# Patient Record
Sex: FEMALE | Race: WHITE | URBAN - METROPOLITAN AREA
[De-identification: names, ages, dates, MRNs, and addresses within clinical notes are randomized per-mention and may not be internally consistent; named-entity substitution may affect disease eponyms.]

---

## 2024-03-13 ENCOUNTER — OFFICE VISIT (OUTPATIENT)
Age: 59
End: 2024-03-13

## 2024-03-13 VITALS — BODY MASS INDEX: 23.14 KG/M2 | HEIGHT: 66 IN | WEIGHT: 144 LBS

## 2024-03-13 DIAGNOSIS — M72.0 DUPUYTREN'S CONTRACTURE OF RIGHT HAND: Primary | ICD-10-CM

## 2024-03-13 DIAGNOSIS — M72.0 DUPUYTREN'S DISEASE OF PALM OF BOTH HANDS: ICD-10-CM

## 2024-03-13 PROCEDURE — 99203 OFFICE O/P NEW LOW 30 MIN: CPT | Performed by: ORTHOPAEDIC SURGERY

## 2024-03-13 RX ORDER — OMEPRAZOLE 20 MG/1
20 CAPSULE, DELAYED RELEASE ORAL DAILY
COMMUNITY

## 2024-03-13 RX ORDER — ALPRAZOLAM 0.5 MG/1
0.5 TABLET ORAL NIGHTLY PRN
COMMUNITY

## 2024-03-13 RX ORDER — LEVOTHYROXINE SODIUM 0.1 MG/1
100 TABLET ORAL DAILY
COMMUNITY

## 2024-03-13 RX ORDER — ESTRADIOL 1 MG/1
1 TABLET ORAL DAILY
COMMUNITY

## 2024-03-13 RX ORDER — LORATADINE 10 MG/1
10 TABLET ORAL DAILY
COMMUNITY

## 2024-03-13 RX ORDER — ERGOCALCIFEROL 1.25 MG/1
50000 CAPSULE ORAL WEEKLY
COMMUNITY

## 2024-03-13 RX ORDER — CELECOXIB 100 MG/1
100 CAPSULE ORAL 2 TIMES DAILY
COMMUNITY

## 2024-03-13 NOTE — PROGRESS NOTES
neck supple.      Right lower leg: No edema.      Left lower leg: No edema.   Skin:     General: Skin is warm and dry.      Capillary Refill: Capillary refill takes less than 2 seconds.      Findings: No bruising or erythema.   Neurological:      General: No focal deficit present.      Mental Status: She is alert and oriented to person, place, and time.   Psychiatric:         Mood and Affect: Mood normal.         Behavior: Behavior normal.          Hand:    Examination L Digit(s) R Digit(s)   1st CMC Tenderness -  -    1st CMC Grind -  -    Rashi Nodes -  -    Heberden Nodes -  -    A1 Pulley Tenderness -  -    Triggering -  -    UCL Instability -  -    RCL Instability -  -    Lateral Stress Pain -  -    Palmar Cords -  + RSF MP 30   Tabletop test -  -    Garrod's Pads -  -     Strength       Pinch Strength         ROM: Full        Imaging:     None indicated      Impression     Diagnosis Orders   1. Dupuytren's contracture of right hand  PROCEDURE AUTHORIZATION TO       2. Dupuytren's disease of palm of both hands              Plan:     Schedule right little finger MCP Xiaflex injection, manipulation, and splinting.    Return for Injection and Manipulation.     Plan was reviewed with patient, who verbalized agreement and understanding of the plan    Note: This note was completed using voice recognition software.  Any typographical/name errors or mistakes are unintentional.

## 2024-08-01 ENCOUNTER — HOSPITAL ENCOUNTER (OUTPATIENT)
Facility: HOSPITAL | Age: 59
Setting detail: RECURRING SERIES
Discharge: HOME | End: 2024-08-04
Payer: COMMERCIAL

## 2024-08-01 PROCEDURE — 97162 PT EVAL MOD COMPLEX 30 MIN: CPT | Performed by: PHYSICAL THERAPIST

## 2024-08-01 NOTE — THERAPY EVALUATION
[facility]  [address] [address] Ph: [phone] Fx: [phone]  Plan of Care / Statement of Necessity for Physical Therapy Services     Patient Name: Jasmin Sanchez : 1965   Medical   Diagnosis: Right ankle pain [M25.571]  Treatment Diagnosis:   M25.571  RIGHT ANKLE PAIN     Onset Date: 2024     Referral Source: Fermín Lopez DPM Start of Care (SOC): 2024   Prior Hospitalization: See medical history Provider #: [id number]   Prior Level of Function: Was working out daily, walking, lift weights, boating.    Comorbidities:  Musculoskeletal disorders, Complications related to surgery, and Other: Anxiety, Depression, recent surgery.     Assessment / key information:  Patient with signs and symptoms consistent with right ankle pain.  She is 8 weeks s/p ORIF right ankle fracture tomorrow.  She presents to PT with axillary crutches and is NWB.  We did discuss WB status.  She has limited AROM/PROM and decreased strength of her right ankle.  Functionally, she is very limited and is not doing any of her normal recreational activities.  She notes she is independent in her self care.    Patient will benefit from a program of skilled physical therapy to include therapeutic exercises to address strength deficits, therapeutic activities to improve functional mobility, neuromuscular reeducation to address balance, coordination and proprioception, manual therapy to address ROM and tissue extensibility and modalities as indicated.  All questions were answered.      Post operative: URGENT: Patient was evaluated for a post operative condition and early physical therapy intervention is critical to positive outcomes.  Insurance authorization should be expedited to prevent delay in treatment.      Evaluation Complexity:  History:  MEDIUM  Complexity : 1-2 comorbidities / personal factors will impact the outcome/ POC ; Examination:  MEDIUM Complexity : 3

## 2024-08-01 NOTE — THERAPY EVALUATION
Problem: Pain  Goal: #Acceptable pain level achieved/maintained at rest using NRS/Faces  Description: This goal is used for patients who can self-report.  Acceptable means the level is at or below the identified comfort/function goal.  Outcome: Outcome Met, Continue evaluating goal progress toward completion     Problem: VTE, Risk for  Goal: # No s/s of VTE  Outcome: Outcome Met, Continue evaluating goal progress toward completion     Problem: Seizures  Goal: Seizure activity controlled  Outcome: Outcome Met, Continue evaluating goal progress toward completion      PHYSICAL / OCCUPATIONAL THERAPY - DAILY TREATMENT NOTE (updated )    Patient Name: Jasmin Sanchez    Date: 2024    : 1965  Insurance: Payor: MITRA / Plan: [facility] / Product Type: *No Product type* /      Patient  verified yes     Visit #   Current / Total 1 8   Time   In / Out 1:40 2:20   Pain   In / Out 5 6   Subjective Functional Status/Changes: Patient fell and injured her right ankle on 2024.     TREATMENT AREA =  Right ankle pain [M25.571]    OBJECTIVE    33 min [x]Eval - untimed                      Therapeutic Procedures:  Tx Min Billable or 1:1 Min (if diff from Tx Min) Procedure, Rationale, Specifics   7  65047 Therapeutic Exercise (timed):  increase ROM, strength, coordination, balance, and proprioception to improve patient's ability to progress to PLOF and address remaining functional goals. (see flow sheet as applicable)     Details if applicable:     7  [facility] Totals Reminder: bill using total billable min of TIMED therapeutic procedures (example: do not include dry needle or estim unattended, both untimed codes, in totals to left)  8-22 min = 1 unit; 23-37 min = 2 units; 38-52 min = 3 units; 53-67 min = 4 units; 68-82 min = 5 units   Total Total     [x]  Patient Education billed concurrently with other procedures   [x] Review HEP    [] Progressed/Changed HEP, detail:    [] Other detail:       Objective Information/Functional Measures/Assessment    SUBJECTIVE  Pain Level (0-10 scale): 5/10 now; 2/10 at best on meds; 10/10 at worst.  [x]constant []intermittent [x]improving []worsening []no change since onset    Subjective functional status/changes:     PLOF: Was working out daily, walking, lift weights, boating.  Limitations to PLOF: Unable to do any activities.  Mechanism of Injury: Patient tripped and fell, injured her right ankle.  ORIF done 2024.  Current symptoms/Complaints: Patient with c/o constant pain in her right ankle pain.  She is walking with

## 2024-08-09 ENCOUNTER — HOSPITAL ENCOUNTER (OUTPATIENT)
Facility: HOSPITAL | Age: 59
Setting detail: RECURRING SERIES
Discharge: HOME | End: 2024-08-12
Payer: COMMERCIAL

## 2024-08-09 PROCEDURE — 97140 MANUAL THERAPY 1/> REGIONS: CPT

## 2024-08-09 PROCEDURE — 97110 THERAPEUTIC EXERCISES: CPT

## 2024-08-09 PROCEDURE — 97112 NEUROMUSCULAR REEDUCATION: CPT

## 2024-08-09 NOTE — PROGRESS NOTES
PHYSICAL / OCCUPATIONAL THERAPY - DAILY TREATMENT NOTE    Patient Name: Jasmin Sanchez    Date: 2024    : 1965  Insurance: Payor: MITRA / Plan: [employer] [facility] [employer] / Product Type: *No Product type* /      Patient  verified Yes     Visit #   Current / Total 2 8   Time   In / Out 1055 1145   Pain   In / Out 2-3 3   Subjective Functional Status/Changes: Patient states she followed up with the doctor yesterday and he was pleased with how everything is progressing.      TREATMENT AREA =  Right ankle pain [M25.571]     OBJECTIVE      Therapeutic Procedures:    08096 Therapeutic Exercise (timed):  increase ROM, strength, coordination, balance, and proprioception to improve patient's ability to progress to PLOF and address remaining functional goals.   Tx Min Billable or 1:1 Min   (if diff from Tx Min) Details:   32 25 See flow sheet as applicable     17635 Neuromuscular Re-Education (timed):  improve balance, coordination, kinesthetic sense, posture, core stability and proprioception to improve patient's ability to develop conscious control of individual muscles and awareness of position of extremities in order to progress to PLOF and address remaining functional goals.   Tx Min Billable or 1:1 Min   (if diff from Tx Min) Details:   10 10 See flow sheet as applicable     49188 Manual Therapy (timed):  decrease pain, increase ROM, increase tissue extensibility, decrease edema, decrease trigger points, and increase postural awareness to improve patient's ability to progress to PLOF and address remaining functional goals.  The manual therapy interventions were performed at a separate and distinct time from the therapeutic activities interventions .   Tx Min Billable or 1:1 Min   (if diff from Tx Min) Details:   8 8 Long sitting- PROM in all plans, PROM of great toe, Ray tray.        50 43 [facility] Totals Reminder: bill using total billable min of TIMED therapeutic procedures (example: do not include

## 2024-08-13 ENCOUNTER — HOSPITAL ENCOUNTER (OUTPATIENT)
Facility: HOSPITAL | Age: 59
Setting detail: RECURRING SERIES
Discharge: HOME | End: 2024-08-16
Payer: COMMERCIAL

## 2024-08-13 PROCEDURE — 97140 MANUAL THERAPY 1/> REGIONS: CPT

## 2024-08-13 PROCEDURE — 97110 THERAPEUTIC EXERCISES: CPT

## 2024-08-13 PROCEDURE — 97112 NEUROMUSCULAR REEDUCATION: CPT

## 2024-08-13 NOTE — PROGRESS NOTES
PHYSICAL / OCCUPATIONAL THERAPY - DAILY TREATMENT NOTE    Patient Name: Jasmin Sanchez    Date: 2024    : 1965  Insurance: Payor: MITRA / Plan: [employer] / Product Type: *No Product type* /      Patient  verified Yes     Visit #   Current / Total 3 8   Time   In / Out 11:41 12:22   Pain   In / Out 2 5   Subjective Functional Status/Changes: Pt states that her foot swells  Pt is taking prednisone 5 days ago, and pt notes it has helped reduce her foot pain.     TREATMENT AREA =  Right ankle pain [M25.571]     OBJECTIVE    Therapeutic Procedures:    14648 Therapeutic Exercise (timed):  increase ROM, strength, coordination, balance, and proprioception to improve patient's ability to progress to PLOF and address remaining functional goals.   Tx Min Billable or 1:1 Min   (if diff from Tx Min) Details:   15  See flow sheet as applicable     74955 Neuromuscular Re-Education (timed):  improve balance, coordination, kinesthetic sense, posture, core stability and proprioception to improve patient's ability to develop conscious control of individual muscles and awareness of position of extremities in order to progress to PLOF and address remaining functional goals.   Tx Min Billable or 1:1 Min   (if diff from Tx Min) Details:   14  See flow sheet as applicable     04490 Manual Therapy (timed):  decrease pain, increase ROM, and increase tissue extensibility to improve patient's ability to progress to PLOF and address remaining functional goals.  The manual therapy interventions were performed at a separate and distinct time from the therapeutic activities interventions .   Tx Min Billable or 1:1 Min   (if diff from Tx Min) Details:   10  Supine -- manual PROM to the ankle in all directions, PROM great toe, and ray glides       39  [facility] Totals Reminder: bill using total billable min of TIMED therapeutic procedures (example: do not include dry needle or estim unattended, both untimed codes, in

## 2024-08-15 ENCOUNTER — HOSPITAL ENCOUNTER (OUTPATIENT)
Facility: HOSPITAL | Age: 59
Setting detail: RECURRING SERIES
Discharge: HOME | End: 2024-08-18
Payer: COMMERCIAL

## 2024-08-15 PROCEDURE — 97110 THERAPEUTIC EXERCISES: CPT | Performed by: PHYSICAL THERAPIST

## 2024-08-15 PROCEDURE — 97140 MANUAL THERAPY 1/> REGIONS: CPT | Performed by: PHYSICAL THERAPIST

## 2024-08-15 PROCEDURE — 97112 NEUROMUSCULAR REEDUCATION: CPT | Performed by: PHYSICAL THERAPIST

## 2024-08-15 NOTE — PROGRESS NOTES
tolerance in order to improve her ambulation status.  Evaluation:  Introduced toe touch WB today.    Next PN/ RC due 9/1/2024  Auth due 14th visit.    PLAN  - Continue Plan of Care  - Upgrade activities as tolerated    Ed Lopez, MOHAN    8/15/2024    9:06 AM  If an interpreting service was utilized for treatment of this patient, the contents of this document represent the material reviewed with the patient via the .     Future Appointments   Date Time Provider Department Center   8/15/2024 11:00 AM Ed Lopez, PT [facility]   8/20/2024 11:00 AM Dre Moreno, PTA [facility]   8/22/2024 11:00 AM Dre Moreno, PTA [facility]   8/27/2024 11:00 AM Andrea Blue, PT [facility]   8/29/2024 11:40 AM Dre Moreno, PTA [facility]

## 2024-08-20 ENCOUNTER — HOSPITAL ENCOUNTER (OUTPATIENT)
Facility: HOSPITAL | Age: 59
Setting detail: RECURRING SERIES
Discharge: HOME | End: 2024-08-23
Payer: COMMERCIAL

## 2024-08-20 PROCEDURE — 97110 THERAPEUTIC EXERCISES: CPT

## 2024-08-20 PROCEDURE — 97140 MANUAL THERAPY 1/> REGIONS: CPT

## 2024-08-20 PROCEDURE — 97112 NEUROMUSCULAR REEDUCATION: CPT

## 2024-08-20 NOTE — PROGRESS NOTES
PHYSICAL / OCCUPATIONAL THERAPY - DAILY TREATMENT NOTE    Patient Name: Jasmin Sanchez    Date: 2024    : 1965  Insurance: Payor: MITRA / Plan: [employer] / Product Type: *No Product type* /      Patient  verified Yes     Visit #   Current / Total 5 8   Time   In / Out 1057 1142   Pain   In / Out 2 2   Subjective Functional Status/Changes: Patient states she feels she is walking more with putting weight through her leg at home. Patient states she felt she did too much at last therapy session due to feeling sore afterwards.      TREATMENT AREA =  Right ankle pain [M25.571]     OBJECTIVE    Therapeutic Procedures:       84319 Therapeutic Exercise (timed):  increase ROM, strength, coordination, balance, and proprioception to improve patient's ability to progress to PLOF and address remaining functional goals.   Tx Min Billable or 1:1 Min   (if diff from Tx Min) Details:   27  See flow sheet as applicable      84077 Neuromuscular Re-Education (timed):  improve balance, coordination, kinesthetic sense, posture, core stability and proprioception to improve patient's ability to develop conscious control of individual muscles and awareness of position of extremities in order to progress to PLOF and address remaining functional goals.   Tx Min Billable or 1:1 Min   (if diff from Tx Min) Details:   10  See flow sheet as applicable      23866 Manual Therapy (timed):  decrease pain, increase ROM, increase tissue extensibility, decrease edema, decrease trigger points, and increase postural awareness to improve patient's ability to progress to PLOF and address remaining functional goals.  The manual therapy interventions were performed at a separate and distinct time from the therapeutic activities interventions .   Tx Min Billable or 1:1 Min   (if diff from Tx Min) Details:   8  Long sitting- PROM in all plans, PROM of great toe, Ray glides.          45  [facility] Totals Reminder: bill using total

## 2024-08-22 ENCOUNTER — HOSPITAL ENCOUNTER (OUTPATIENT)
Facility: HOSPITAL | Age: 59
Setting detail: RECURRING SERIES
Discharge: HOME | End: 2024-08-25
Payer: COMMERCIAL

## 2024-08-22 PROCEDURE — 97140 MANUAL THERAPY 1/> REGIONS: CPT

## 2024-08-22 PROCEDURE — 97112 NEUROMUSCULAR REEDUCATION: CPT

## 2024-08-22 PROCEDURE — 97110 THERAPEUTIC EXERCISES: CPT

## 2024-08-22 NOTE — PROGRESS NOTES
PHYSICAL / OCCUPATIONAL THERAPY - DAILY TREATMENT NOTE    Patient Name: Jasmin Sanchez    Date: 2024    : 1965  Insurance: Payor: MITRA / Plan: [employer] / Product Type: *No Product type* /      Patient  verified Yes     Visit #   Current / Total 6 8   Time   In / Out 1055 1145   Pain   In / Out 2-3 4   Subjective Functional Status/Changes: Patient states that she walked around the grocery store yesterday.      TREATMENT AREA =  Right ankle pain [M25.571]     OBJECTIVE      Therapeutic Procedures:       82442 Therapeutic Exercise (timed):  increase ROM, strength, coordination, balance, and proprioception to improve patient's ability to progress to PLOF and address remaining functional goals.   Tx Min Billable or 1:1 Min   (if diff from Tx Min) Details:   27 25  See flow sheet as applicable      74441 Neuromuscular Re-Education (timed):  improve balance, coordination, kinesthetic sense, posture, core stability and proprioception to improve patient's ability to develop conscious control of individual muscles and awareness of position of extremities in order to progress to PLOF and address remaining functional goals.   Tx Min Billable or 1:1 Min   (if diff from Tx Min) Details:   10 10  See flow sheet as applicable      14047 Manual Therapy (timed):  decrease pain, increase ROM, increase tissue extensibility, decrease edema, decrease trigger points, and increase postural awareness to improve patient's ability to progress to PLOF and address remaining functional goals.  The manual therapy interventions were performed at a separate and distinct time from the therapeutic activities interventions .   Tx Min Billable or 1:1 Min   (if diff from Tx Min) Details:   8 8  Long sitting- PROM in all plans, PROM of great toe, Ray glides.          50 43 [facility] Totals Reminder: bill using total billable min of TIMED therapeutic procedures (example: do not include dry needle or estim unattended, both

## 2024-08-27 ENCOUNTER — HOSPITAL ENCOUNTER (OUTPATIENT)
Facility: HOSPITAL | Age: 59
Setting detail: RECURRING SERIES
Discharge: HOME | End: 2024-08-30
Payer: COMMERCIAL

## 2024-08-27 PROCEDURE — 97140 MANUAL THERAPY 1/> REGIONS: CPT

## 2024-08-27 PROCEDURE — 97110 THERAPEUTIC EXERCISES: CPT

## 2024-08-27 PROCEDURE — 97530 THERAPEUTIC ACTIVITIES: CPT

## 2024-08-27 NOTE — THERAPY RECERTIFICATION
[facility]  [address] [address] Ph: [phone] Fx: [phone]  PHYSICAL THERAPY PROGRESS NOTE  Patient Name: Jasmin Sanchez : 1965   Treatment/Medical Diagnosis: Right ankle pain [M25.571]   Referral Source: Fermín Lopez DPM     Date of Initial Visit: 24 Attended Visits: 7 Missed Visits: 0     SUMMARY OF TREATMENT  Treatment has been focused on range of motion, foot strengthening and initiation of functional progression.    CURRENT STATUS  Short Term Goals: To be accomplished in 1 WEEKS  1.  Patient will become proficient in their HEP and will be compliant in performing that program.  Evaluation:   Patient given a written/illustrated HEP.  Current; MET: patient reports performing HEP. 24     Long Term Goals: To be accomplished in 4 WEEKS  1. Patient's pain level will be 1-2/10 with activity in order to improve patient's ability to perform normal ADLs.  Evaluation:  2/10-10/10  Current: pain rating  worst: 210 24  Goal MET  2. Patient will demonstrate  AROM right ankle DF 0-5, PF 0-60, Inversion 0-45, eversion 0-15 to increase ease of ADLs.  Evaluation:  DF 60-20, PF 20-60, Inversion 0-15, Eversion 15-0  Current: DF= 5 PF= 40 Inversion=10 Eversion= 20  Goal not met  3. Patient will increase LEFS score to greater than 50 to indicate increased functional mobility.  Evaluation:  LEFS = 80  Current: 34/80  Goal not met  4. Patient will progress WB status to WB to tolerance in order to improve her ambulation status.  Evaluation:  Introduced toe touch WB today.  Current: progressing: patient intermittent ambulating with TTWB 24     Next PN: 2024  Auth due 14th visit, expires: 10/30/24      Payor: MITRA / Plan: [employer] / Product Type: *No Product type* /     Non-Medicare, can change goals, can adjust or add frequency duration, no signature required      New Goals to be achieved in __4__

## 2024-08-27 NOTE — PROGRESS NOTES
PHYSICAL / OCCUPATIONAL THERAPY - DAILY TREATMENT NOTE    Patient Name: Jasmin Sanchez    Date: 2024    : 1965  Insurance: Payor: MITRA / Plan: [employer] / Product Type: *No Product type* /      Patient  verified Yes     Visit #   Current / Total 7 8   Time   In / Out 11:00 11:40   Pain   In / Out 2 2   Subjective Functional Status/Changes: Pt. Sees MD on . C/o hypersensitivity on parts of foot.     TREATMENT AREA =  Right ankle pain [M25.571]         Therapeutic Procedures:    75083 Therapeutic Exercise (timed):  increase ROM, strength, coordination, balance, and proprioception to improve patient's ability to progress to PLOF and address remaining functional goals.   Tx Min Billable or 1:1 Min   (if diff from Tx Min) Details:   10  See flow sheet as applicable       92241 Manual Therapy (timed):  decrease pain, increase ROM, and increase tissue extensibility to improve patient's ability to progress to PLOF and address remaining functional goals.  The manual therapy interventions were performed at a separate and distinct time from the therapeutic activities interventions .   Tx Min Billable or 1:1 Min   (if diff from Tx Min) Details:   15  Scar mobs/ROM, dorsal STM     89633 Therapeutic Activity (timed):  use of dynamic activities replicating functional movements to increase ROM, strength, coordination, balance, and proprioception in order to improve patient's ability to progress to PLOF and address remaining functional goals.   Tx Min Billable or 1:1 Min   (if diff from Tx Min) Details:   15  See flow sheet as applicable and Pt. Education on desensitization, goals following progress assessment.       40  [facility] Totals Reminder: bill using total billable min of TIMED therapeutic procedures (example: do not include dry needle or estim unattended, both untimed codes, in totals to left)  8-22 min = 1 unit; 23-37 min = 2 units; 38-52 min = 3 units; 53-67 min = 4 units; 68-82 min =  treatment of this patient, the contents of this document represent the material reviewed with the patient via the .     Future Appointments   Date Time Provider Department Center   8/29/2024 11:40 AM Dre Moreno, PTA [facility]   9/3/2024 11:00 AM Adnrea Blue, PT [facility]   9/5/2024 11:00 AM Dre Moreno, PTA [facility]   9/10/2024 11:00 AM Carissa Wilcox PT [facility]   9/12/2024 11:00 AM Dre Moreno, PTA [facility]   9/17/2024 11:00 AM Dre Moreno, PTA [facility]   9/19/2024 11:00 AM Dre Moreno, PTA [facility]

## 2024-08-29 ENCOUNTER — TELEPHONE (OUTPATIENT)
Facility: HOSPITAL | Age: 59
End: 2024-08-29

## 2024-08-29 ENCOUNTER — HOSPITAL ENCOUNTER (OUTPATIENT)
Facility: HOSPITAL | Age: 59
Setting detail: RECURRING SERIES
End: 2024-08-29
Payer: COMMERCIAL

## 2024-08-29 NOTE — TELEPHONE ENCOUNTER
C[facility] <24hrs. Patient is unable to make today's appt, has a scheduling conflict. Rquested something later but the schedule is full, will add to wait list.

## 2024-09-03 ENCOUNTER — HOSPITAL ENCOUNTER (OUTPATIENT)
Facility: HOSPITAL | Age: 59
Setting detail: RECURRING SERIES
Discharge: HOME | End: 2024-09-06
Payer: COMMERCIAL

## 2024-09-03 PROCEDURE — 97530 THERAPEUTIC ACTIVITIES: CPT

## 2024-09-03 PROCEDURE — 97110 THERAPEUTIC EXERCISES: CPT

## 2024-09-03 PROCEDURE — 97140 MANUAL THERAPY 1/> REGIONS: CPT

## 2024-09-03 NOTE — PROGRESS NOTES
PHYSICAL / OCCUPATIONAL THERAPY - DAILY TREATMENT NOTE    Patient Name: Jasmin Sanchez    Date: 9/3/2024    : 1965  Insurance: Payor: MITRA / Plan: [employer] / Product Type: *No Product type* /      Patient  verified Yes     Visit #   Current / Total 8 16   Time   In / Out 11:00 11:40   Pain   In / Out 3 3   Subjective Functional Status/Changes: Pt. Visited MD who is pleased with progress. Pt. Is allowed to weight bear as tolerated and use aircast without boot, if it does not aggravate sxs. Progress as tolerated.     TREATMENT AREA =  Right ankle pain [M25.571]       Therapeutic Procedures:    04917 Therapeutic Exercise (timed):  increase ROM, strength, coordination, balance, and proprioception to improve patient's ability to progress to PLOF and address remaining functional goals.   Tx Min Billable or 1:1 Min   (if diff from Tx Min) Details:   15  See flow sheet as applicable       37814 Manual Therapy (timed):  decrease pain and increase tissue extensibility to improve patient's ability to progress to PLOF and address remaining functional goals.  The manual therapy interventions were performed at a separate and distinct time from the therapeutic activities interventions .   Tx Min Billable or 1:1 Min   (if diff from Tx Min) Details:   10  L iliac upslip correction, plantar surface STM, calf STM.     66315 Therapeutic Activity (timed):  use of dynamic activities replicating functional movements to increase ROM, strength, coordination, balance, and proprioception in order to improve patient's ability to progress to PLOF and address remaining functional goals.   Tx Min Billable or 1:1 Min   (if diff from Tx Min) Details:   15  See flow sheet as applicable and Pt. Education on hip alignment, crutch use, progression.       40  [facility] Totals Reminder: bill using total billable min of TIMED therapeutic procedures (example: do not include dry needle or estim unattended, both untimed codes, in

## 2024-09-05 ENCOUNTER — HOSPITAL ENCOUNTER (OUTPATIENT)
Facility: HOSPITAL | Age: 59
Setting detail: RECURRING SERIES
Discharge: HOME | End: 2024-09-08
Payer: COMMERCIAL

## 2024-09-05 PROCEDURE — 97110 THERAPEUTIC EXERCISES: CPT

## 2024-09-05 PROCEDURE — 97112 NEUROMUSCULAR REEDUCATION: CPT

## 2024-09-05 PROCEDURE — 97140 MANUAL THERAPY 1/> REGIONS: CPT

## 2024-09-10 ENCOUNTER — HOSPITAL ENCOUNTER (OUTPATIENT)
Facility: HOSPITAL | Age: 59
Setting detail: RECURRING SERIES
Discharge: HOME | End: 2024-09-13
Payer: COMMERCIAL

## 2024-09-10 PROCEDURE — 97140 MANUAL THERAPY 1/> REGIONS: CPT

## 2024-09-10 PROCEDURE — 97110 THERAPEUTIC EXERCISES: CPT

## 2024-09-10 PROCEDURE — 97112 NEUROMUSCULAR REEDUCATION: CPT

## 2024-09-12 ENCOUNTER — HOSPITAL ENCOUNTER (OUTPATIENT)
Facility: HOSPITAL | Age: 59
Setting detail: RECURRING SERIES
Discharge: HOME | End: 2024-09-15
Payer: COMMERCIAL

## 2024-09-12 PROCEDURE — 97112 NEUROMUSCULAR REEDUCATION: CPT

## 2024-09-12 PROCEDURE — 97110 THERAPEUTIC EXERCISES: CPT

## 2024-09-12 PROCEDURE — 97530 THERAPEUTIC ACTIVITIES: CPT

## 2024-09-12 PROCEDURE — 97140 MANUAL THERAPY 1/> REGIONS: CPT

## 2024-09-17 ENCOUNTER — HOSPITAL ENCOUNTER (OUTPATIENT)
Facility: HOSPITAL | Age: 59
Setting detail: RECURRING SERIES
Discharge: HOME | End: 2024-09-20
Payer: COMMERCIAL

## 2024-09-17 PROCEDURE — 97112 NEUROMUSCULAR REEDUCATION: CPT

## 2024-09-17 PROCEDURE — 97140 MANUAL THERAPY 1/> REGIONS: CPT

## 2024-09-17 PROCEDURE — 97110 THERAPEUTIC EXERCISES: CPT

## 2024-09-19 ENCOUNTER — APPOINTMENT (OUTPATIENT)
Facility: HOSPITAL | Age: 59
End: 2024-09-19
Payer: COMMERCIAL

## 2024-09-20 ENCOUNTER — HOSPITAL ENCOUNTER (OUTPATIENT)
Facility: HOSPITAL | Age: 59
Setting detail: RECURRING SERIES
Discharge: HOME | End: 2024-09-23
Payer: COMMERCIAL

## 2024-09-20 PROCEDURE — 97530 THERAPEUTIC ACTIVITIES: CPT

## 2024-09-20 PROCEDURE — 97110 THERAPEUTIC EXERCISES: CPT

## 2024-09-20 PROCEDURE — 97112 NEUROMUSCULAR REEDUCATION: CPT

## 2024-09-25 ENCOUNTER — HOSPITAL ENCOUNTER (OUTPATIENT)
Facility: HOSPITAL | Age: 59
Setting detail: RECURRING SERIES
Discharge: HOME | End: 2024-09-28
Payer: COMMERCIAL

## 2024-09-25 PROCEDURE — 97530 THERAPEUTIC ACTIVITIES: CPT

## 2024-09-25 PROCEDURE — 97110 THERAPEUTIC EXERCISES: CPT

## 2024-09-25 PROCEDURE — 97112 NEUROMUSCULAR REEDUCATION: CPT

## 2024-10-01 ENCOUNTER — HOSPITAL ENCOUNTER (OUTPATIENT)
Facility: HOSPITAL | Age: 59
Setting detail: RECURRING SERIES
Discharge: HOME | End: 2024-10-04
Payer: COMMERCIAL

## 2024-10-01 PROCEDURE — 97110 THERAPEUTIC EXERCISES: CPT

## 2024-10-01 PROCEDURE — 97530 THERAPEUTIC ACTIVITIES: CPT

## 2024-10-01 PROCEDURE — 97112 NEUROMUSCULAR REEDUCATION: CPT

## 2024-10-01 NOTE — PROGRESS NOTES
goals.   Tx Min Billable or 1:1 Min   (if diff from Tx Min) Details:   10  See flow sheet as applicable       40  [facility] Totals Reminder: bill using total billable min of TIMED therapeutic procedures (example: do not include dry needle or estim unattended, both untimed codes, in totals to left)  8-22 min = 1 unit; 23-37 min = 2 units; 38-52 min = 3 units; 53-67 min = 4 units; 68-82 min = 5 units   Total Total     [x]  Patient Education billed concurrently with other procedures   [x] Review HEP    [] Progressed/Changed HEP, detail:    [] Other detail:       Objective Information/Functional Measures/Assessment    Increased shuttle reps by 5 DL and SL which was well tolerated. Increased 3 way hip resistance from red to green band which was also well tolerated today. At conclusion of soleus stretch, pt. Stated that it was painful. Therapist encouraged pt. To let him know next time, and an adjustment will be made.  Pt. Education on functional eccentric dorsiflexion with descending stairs to increase motion. Exercises performed without brace and pt. Reported an appreciable increase in discomfort to 7/10 at conclusion of session. Finished with ice.    Patient will continue to benefit from skilled PT / OT services to modify and progress therapeutic interventions, analyze and address functional mobility deficits, analyze and address ROM deficits, and analyze and address strength deficits to address functional deficits and attain remaining goals.    Progress toward goals / Updated goals:  []  See Progress Note/Recertification    1. Patient will demonstrate  AROM right ankle DF 0-5, PF 0-60, Inversion 0-45, eversion 0-15 to increase ease of ADLs.  AT progress: R DF 6 degrees deg from neutral , R PF 53, R INV 35, R EVR 35 10/1/24  2. Patient will increase LEFS score to greater than 50 to indicate increased functional mobility.  At PN:  regressed; LEFS = 33/80  3.  Patient will progress WB status to WB to tolerance in order to

## 2024-10-03 ENCOUNTER — HOSPITAL ENCOUNTER (OUTPATIENT)
Facility: HOSPITAL | Age: 59
Setting detail: RECURRING SERIES
Discharge: HOME | End: 2024-10-06
Payer: COMMERCIAL

## 2024-10-03 PROCEDURE — 97110 THERAPEUTIC EXERCISES: CPT

## 2024-10-03 PROCEDURE — 97140 MANUAL THERAPY 1/> REGIONS: CPT

## 2024-10-03 PROCEDURE — 97112 NEUROMUSCULAR REEDUCATION: CPT

## 2024-10-03 NOTE — PROGRESS NOTES
PHYSICAL / OCCUPATIONAL THERAPY - DAILY TREATMENT NOTE    Patient Name: Jasmin Sanchez    Date: 10/3/2024    : 1965  Insurance: Payor: [employer] / Plan: [facility] / Product Type: *No Product type* /      Patient  verified Yes     Visit #   Current / Total 3 8   Time   In / Out 1021 1110   Pain   In / Out 4 5   Subjective Functional Status/Changes: Patient reports that her pain tatiana significantly to 7-8/10 yesterday but has declined to 4/10 today. \"I could barely walk\".     TREATMENT AREA =  Right ankle pain [M25.571]     OBJECTIVE    Ice (UNBILLED):  location/position: R foot     Min Rationale   10 decrease pain to improve patient's ability to progress to PLOF and address remaining functional goals.     Skin assessment post-treatment:   Intact     Therapeutic Procedures:    58718 Therapeutic Exercise (timed):  increase ROM, strength, coordination, balance, and proprioception to improve patient's ability to progress to PLOF and address remaining functional goals.   Tx Min Billable or 1:1 Min   (if diff from Tx Min) Details:   15 15 See flow sheet as applicable     15189 Neuromuscular Re-Education (timed):  improve balance, coordination, kinesthetic sense, posture, core stability and proprioception to improve patient's ability to develop conscious control of individual muscles and awareness of position of extremities in order to progress to PLOF and address remaining functional goals.   Tx Min Billable or 1:1 Min   (if diff from Tx Min) Details:   16 16 See flow sheet       53823 Manual Therapy (timed):  decrease pain, increase ROM, increase tissue extensibility, and decrease trigger points to improve patient's ability to progress to PLOF and address remaining functional goals.  The manual therapy interventions were performed at a separate and distinct time from the therapeutic activities interventions . (see flow sheet as applicable)    Tx Min Billable or 1:1 Min   (if diff from Tx Min)

## 2024-10-09 ENCOUNTER — APPOINTMENT (OUTPATIENT)
Facility: HOSPITAL | Age: 59
End: 2024-10-09
Payer: COMMERCIAL

## 2024-10-09 ENCOUNTER — TELEPHONE (OUTPATIENT)
Facility: HOSPITAL | Age: 59
End: 2024-10-09

## 2024-10-09 NOTE — TELEPHONE ENCOUNTER
Patient cxl <24 due to being under the weather. Patient rescheduled to 10/10 in hopes of feeling a little better

## 2024-10-10 ENCOUNTER — HOSPITAL ENCOUNTER (OUTPATIENT)
Facility: HOSPITAL | Age: 59
Setting detail: RECURRING SERIES
Discharge: HOME | End: 2024-10-13
Payer: COMMERCIAL

## 2024-10-10 PROCEDURE — 97110 THERAPEUTIC EXERCISES: CPT

## 2024-10-10 PROCEDURE — 97112 NEUROMUSCULAR REEDUCATION: CPT

## 2024-10-10 PROCEDURE — 97530 THERAPEUTIC ACTIVITIES: CPT

## 2024-10-10 NOTE — PROGRESS NOTES
single leg heel raises without increase in pain 3/10.   AT PN: patient able to perform double leg heel raises.   Current: Pt. Is still able to perform 20 double leg heel raises today 10/1/24      Next PN/ RC due 10/20/2024  Auth due 19 visits total or 11/24/24  PLAN  - Continue Plan of Care    Andrea Blue, PT    10/10/2024    2:31 PM  If an interpreting service was utilized for treatment of this patient, the contents of this document represent the material reviewed with the patient via the .     Future Appointments   Date Time Provider Department Center   10/16/2024 11:00 AM Dre Moreno PTA [facility]   10/23/2024 11:00 AM Dre Moreno PTA [facility]

## 2024-10-16 ENCOUNTER — HOSPITAL ENCOUNTER (OUTPATIENT)
Facility: HOSPITAL | Age: 59
Setting detail: RECURRING SERIES
Discharge: HOME | End: 2024-10-19
Payer: COMMERCIAL

## 2024-10-16 PROCEDURE — 97112 NEUROMUSCULAR REEDUCATION: CPT

## 2024-10-16 PROCEDURE — 97110 THERAPEUTIC EXERCISES: CPT

## 2024-10-16 PROCEDURE — 97530 THERAPEUTIC ACTIVITIES: CPT

## 2024-10-16 NOTE — PROGRESS NOTES
therapeutic procedures (example: do not include dry needle or estim unattended, both untimed codes, in totals to left)  8-22 min = 1 unit; 23-37 min = 2 units; 38-52 min = 3 units; 53-67 min = 4 units; 68-82 min = 5 units   Total Total         [x]  Patient Education billed concurrently with other procedures   [x] Review HEP    [] Progressed/Changed HEP, detail:    [] Other detail:       Objective Information/Functional Measures/Assessment    Patient reports her ankle is improving but not where she wants it to be. Patient states continuing to have difficulty with descending starts and continues to have an altered gait pattern.     Patient will continue to benefit from skilled PT / OT services to modify and progress therapeutic interventions, analyze and address functional mobility deficits, analyze and address ROM deficits, analyze and address strength deficits, analyze and address soft tissue restrictions, analyze and cue for proper movement patterns, analyze and modify for postural abnormalities, analyze and address imbalance/dizziness, and instruct in home and community integration to address functional deficits and attain remaining goals.    Progress toward goals / Updated goals:  []  See Progress Note/Recertification      1. Patient will demonstrate  AROM right ankle DF 0-5, PF 0-60, Inversion 0-45, eversion 0-15 to increase ease of ADLs.  AT progress: R DF 6 degrees deg from neutral , R PF 53, R INV 35, R EVR 35 10/1/24  Current; R DF lacking 5, PF 58, Inv 35 deg, EV 0-25 deg. 10/16/24     2. Patient will increase LEFS score to greater than 50 to indicate increased functional mobility.  At PN:  regressed; LEFS = 33/80  Current; progressing; LEFS = 46/80. 10/16/24     3.  Patient will progress WB status to WB to tolerance in order to improve her ambulation status.  AT PN: progressing patient ambulates with WBAT with Aircast and no use of AD. 9/20/2024  Current: progressing: patient ambulates with no AD and no

## 2024-10-16 NOTE — THERAPY RECERTIFICATION
[facility]  [address] [address] Ph: [phone] Fx: [phone]  PHYSICAL THERAPY PROGRESS NOTE  Patient Name: Jasmin Sanchez : 1965   Treatment/Medical Diagnosis: Right ankle pain [M25.571]   Referral Source: Fermín Lopez DPM     Date of Initial Visit: 2024 Attended Visits: 18 Missed Visits: 1     SUMMARY OF TREATMENT    Patient reports her ankle is improving but not where she wants it to be. Patient states continuing to have difficulty with descending starts and continues to have an altered gait pattern.     CURRENT STATUS    1. Patient will demonstrate  AROM right ankle DF 0-5, PF 0-60, Inversion 0-45, eversion 0-15 to increase ease of ADLs.  AT progress: R DF 6 degrees deg from neutral , R PF 53, R INV 35, R EVR 35 10/1/24  Current; R DF lacking 5, PF 58, Inv 35 deg, EV 0-25 deg. 10/16/24     2. Patient will increase LEFS score to greater than 50 to indicate increased functional mobility.  At PN:  regressed; LEFS = 33/80  Current; progressing; LEFS = 46/80. 10/16/24     3.  Patient will progress WB status to WB to tolerance in order to improve her ambulation status.  AT PN: progressing patient ambulates with WBAT with Aircast and no use of AD. 2024  Current: progressing: patient ambulates with no AD and no aircast with antalgic gait pattern. 10/16/24     4. Patient will report being able to ambulate for 20 mins as part of a walking program with no more than a 3/10 pain to increase ease to return to recreational program.   AT PN: not initiated ambulation program  Current: progressing: patient able to ambulate 15 mins around grocery store. 10/16/24.      5. Patient will be able to perform 15 single leg heel raises without increase in pain 3/10.   AT PN: patient able to perform double leg heel raises.   Current: Pt. Able to just clear heel from floor with Moderate use of UE for single leg HR, but able to perform through full

## 2024-10-23 ENCOUNTER — HOSPITAL ENCOUNTER (OUTPATIENT)
Facility: HOSPITAL | Age: 59
Setting detail: RECURRING SERIES
Discharge: HOME | End: 2024-10-26
Payer: COMMERCIAL

## 2024-10-23 PROCEDURE — 97112 NEUROMUSCULAR REEDUCATION: CPT

## 2024-10-23 PROCEDURE — 97110 THERAPEUTIC EXERCISES: CPT

## 2024-10-23 PROCEDURE — 97530 THERAPEUTIC ACTIVITIES: CPT

## 2024-10-23 NOTE — PROGRESS NOTES
PHYSICAL / OCCUPATIONAL THERAPY - DAILY TREATMENT NOTE    Patient Name: Jasmin Sanchez    Date: 10/23/2024    : 1965  Insurance: Payor: [employer] / Plan: [employer] / Product Type: *No Product type* /      Patient  verified Yes     Visit #   Current / Total 1 16   Time   In / Out 1058 1150   Pain   In / Out 1 1   Subjective Functional Status/Changes: Patient states she did a lot of walking the other day and had a large increase in pain that night.      TREATMENT AREA =  Right ankle pain [M25.571]     OBJECTIVE    Ice (UNBILLED):  location/position: sitting; right ankle     Min Rationale   10 decrease pain to improve patient's ability to progress to PLOF and address remaining functional goals.     Skin assessment post-treatment:   Intact      Therapeutic Procedures:       51417 Therapeutic Exercise (timed):  increase ROM, strength, coordination, balance, and proprioception to improve patient's ability to progress to PLOF and address remaining functional goals.   Tx Min Billable or 1:1 Min   (if diff from Tx Min) Details:   16   See flow sheet as applicable      38708 Neuromuscular Re-Education (timed):  improve balance, coordination, kinesthetic sense, posture, core stability and proprioception to improve patient's ability to develop conscious control of individual muscles and awareness of position of extremities in order to progress to PLOF and address remaining functional goals.   Tx Min Billable or 1:1 Min   (if diff from Tx Min) Details:   16   See flow sheet as applicable      14915 Therapeutic Activity (timed):  use of dynamic activities replicating functional movements to increase ROM, strength, coordination, balance, and proprioception in order to improve patient's ability to progress to PLOF and address remaining functional goals.   Tx Min Billable or 1:1 Min   (if diff from Tx Min) Details:   10   See flow sheet as applicable         42    BC Totals Reminder: bill using total

## 2024-10-30 ENCOUNTER — TELEPHONE (OUTPATIENT)
Facility: HOSPITAL | Age: 59
End: 2024-10-30

## 2024-10-30 NOTE — TELEPHONE ENCOUNTER
Called to inform patient that insurance did not approve anymore visit. Discussed HEP and how to independently progress program and that we will be discharging patient chart.